# Patient Record
Sex: MALE | Race: WHITE | NOT HISPANIC OR LATINO | Employment: OTHER | URBAN - METROPOLITAN AREA
[De-identification: names, ages, dates, MRNs, and addresses within clinical notes are randomized per-mention and may not be internally consistent; named-entity substitution may affect disease eponyms.]

---

## 2024-01-01 ENCOUNTER — NURSING HOME VISIT (OUTPATIENT)
Dept: GERIATRICS | Facility: CLINIC | Age: 84
End: 2024-01-01
Payer: OTHER MISCELLANEOUS

## 2024-01-01 ENCOUNTER — HOSPITAL ENCOUNTER (OUTPATIENT)
Facility: HOSPITAL | Age: 84
Setting detail: OBSERVATION
Discharge: NURSING, REHAB OR LONG-TERM CARE | End: 2024-03-15
Attending: EMERGENCY MEDICINE | Admitting: EMERGENCY MEDICINE
Payer: OTHER MISCELLANEOUS

## 2024-01-01 ENCOUNTER — MEDICAL CORRESPONDENCE (OUTPATIENT)
Dept: HEALTH INFORMATION MANAGEMENT | Facility: CLINIC | Age: 84
End: 2024-01-01

## 2024-01-01 ENCOUNTER — PATIENT OUTREACH (OUTPATIENT)
Dept: CARE COORDINATION | Facility: CLINIC | Age: 84
End: 2024-01-01
Payer: COMMERCIAL

## 2024-01-01 ENCOUNTER — MEDICAL CORRESPONDENCE (OUTPATIENT)
Dept: HEALTH INFORMATION MANAGEMENT | Facility: CLINIC | Age: 84
End: 2024-01-01
Payer: COMMERCIAL

## 2024-01-01 ENCOUNTER — DOCUMENTATION ONLY (OUTPATIENT)
Dept: GERIATRICS | Facility: CLINIC | Age: 84
End: 2024-01-01
Payer: COMMERCIAL

## 2024-01-01 VITALS
RESPIRATION RATE: 20 BRPM | HEIGHT: 69 IN | WEIGHT: 129.6 LBS | OXYGEN SATURATION: 92 % | SYSTOLIC BLOOD PRESSURE: 136 MMHG | HEART RATE: 90 BPM | BODY MASS INDEX: 19.2 KG/M2 | DIASTOLIC BLOOD PRESSURE: 77 MMHG | TEMPERATURE: 98.4 F

## 2024-01-01 VITALS
WEIGHT: 147.49 LBS | DIASTOLIC BLOOD PRESSURE: 60 MMHG | SYSTOLIC BLOOD PRESSURE: 109 MMHG | TEMPERATURE: 98.1 F | OXYGEN SATURATION: 94 % | RESPIRATION RATE: 16 BRPM | HEART RATE: 74 BPM

## 2024-01-01 DIAGNOSIS — G93.89 BRAIN MASS: Primary | ICD-10-CM

## 2024-01-01 DIAGNOSIS — Z51.5 HOSPICE CARE PATIENT: ICD-10-CM
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
## 2024-01-01 DIAGNOSIS — I48.0 PAROXYSMAL ATRIAL FIBRILLATION (H): ICD-10-CM

## 2024-01-01 DIAGNOSIS — S41.111A LACERATION OF RIGHT UPPER EXTREMITY, INITIAL ENCOUNTER: ICD-10-CM

## 2024-01-01 DIAGNOSIS — F03.90 MAJOR NEUROCOGNITIVE DISORDER (H): ICD-10-CM

## 2024-01-01 PROCEDURE — 99285 EMERGENCY DEPT VISIT HI MDM: CPT | Mod: 25

## 2024-01-01 PROCEDURE — G0378 HOSPITAL OBSERVATION PER HR: HCPCS

## 2024-01-01 PROCEDURE — 99239 HOSP IP/OBS DSCHRG MGMT >30: CPT | Performed by: STUDENT IN AN ORGANIZED HEALTH CARE EDUCATION/TRAINING PROGRAM

## 2024-01-01 PROCEDURE — 250N000013 HC RX MED GY IP 250 OP 250 PS 637: Performed by: STUDENT IN AN ORGANIZED HEALTH CARE EDUCATION/TRAINING PROGRAM

## 2024-01-01 PROCEDURE — 12002 RPR S/N/AX/GEN/TRNK2.6-7.5CM: CPT

## 2024-01-01 PROCEDURE — 99304 1ST NF CARE SF/LOW MDM 25: CPT | Performed by: NURSE PRACTITIONER

## 2024-01-01 PROCEDURE — 250N000009 HC RX 250: Performed by: EMERGENCY MEDICINE

## 2024-01-01 PROCEDURE — 99223 1ST HOSP IP/OBS HIGH 75: CPT | Performed by: STUDENT IN AN ORGANIZED HEALTH CARE EDUCATION/TRAINING PROGRAM

## 2024-01-01 PROCEDURE — 999N000147 HC STATISTIC PT IP EVAL DEFER

## 2024-01-01 RX ORDER — NALOXONE HYDROCHLORIDE 0.4 MG/ML
0.4 INJECTION, SOLUTION INTRAMUSCULAR; INTRAVENOUS; SUBCUTANEOUS
Status: DISCONTINUED | OUTPATIENT
Start: 2024-01-01 | End: 2024-01-01 | Stop reason: HOSPADM

## 2024-01-01 RX ORDER — HALOPERIDOL 2 MG/ML
1 SOLUTION ORAL
COMMUNITY

## 2024-01-01 RX ORDER — ACETAMINOPHEN 650 MG/1
650 SUPPOSITORY RECTAL EVERY 4 HOURS PRN
Status: DISCONTINUED | OUTPATIENT
Start: 2024-01-01 | End: 2024-01-01 | Stop reason: HOSPADM

## 2024-01-01 RX ORDER — MORPHINE SULFATE 20 MG/ML
5 SOLUTION ORAL
COMMUNITY
Start: 2024-01-01

## 2024-01-01 RX ORDER — ACETAMINOPHEN 325 MG/1
650 TABLET ORAL EVERY 4 HOURS PRN
Status: DISCONTINUED | OUTPATIENT
Start: 2024-01-01 | End: 2024-01-01 | Stop reason: HOSPADM

## 2024-01-01 RX ORDER — QUETIAPINE FUMARATE 25 MG/1
25 TABLET, FILM COATED ORAL 3 TIMES DAILY PRN
COMMUNITY
End: 2024-01-01

## 2024-01-01 RX ORDER — LORAZEPAM 2 MG/ML
1 CONCENTRATE ORAL EVERY 5 MIN PRN
Status: DISCONTINUED | OUTPATIENT
Start: 2024-01-01 | End: 2024-01-01 | Stop reason: HOSPADM

## 2024-01-01 RX ORDER — AMOXICILLIN 250 MG
1 CAPSULE ORAL 2 TIMES DAILY PRN
Status: DISCONTINUED | OUTPATIENT
Start: 2024-01-01 | End: 2024-01-01 | Stop reason: HOSPADM

## 2024-01-01 RX ORDER — AMOXICILLIN 250 MG
2 CAPSULE ORAL 2 TIMES DAILY PRN
Status: DISCONTINUED | OUTPATIENT
Start: 2024-01-01 | End: 2024-01-01 | Stop reason: HOSPADM

## 2024-01-01 RX ORDER — TRAZODONE HYDROCHLORIDE 50 MG/1
50 TABLET, FILM COATED ORAL AT BEDTIME
COMMUNITY
End: 2024-01-01

## 2024-01-01 RX ORDER — LORAZEPAM 0.5 MG/1
.5-1 TABLET ORAL EVERY 4 HOURS PRN
Status: DISCONTINUED | OUTPATIENT
Start: 2024-01-01 | End: 2024-01-01 | Stop reason: HOSPADM

## 2024-01-01 RX ORDER — BISACODYL 10 MG
10 SUPPOSITORY, RECTAL RECTAL DAILY PRN
Status: DISCONTINUED | OUTPATIENT
Start: 2024-01-01 | End: 2024-01-01 | Stop reason: HOSPADM

## 2024-01-01 RX ORDER — PROCHLORPERAZINE 25 MG
25 SUPPOSITORY, RECTAL RECTAL EVERY 12 HOURS PRN
COMMUNITY
Start: 2024-01-01

## 2024-01-01 RX ORDER — QUETIAPINE FUMARATE 25 MG/1
25 TABLET, FILM COATED ORAL 3 TIMES DAILY PRN
Status: DISCONTINUED | OUTPATIENT
Start: 2024-01-01 | End: 2024-01-01 | Stop reason: HOSPADM

## 2024-01-01 RX ORDER — TRAZODONE HYDROCHLORIDE 50 MG/1
50 TABLET, FILM COATED ORAL AT BEDTIME
Status: DISCONTINUED | OUTPATIENT
Start: 2024-01-01 | End: 2024-01-01 | Stop reason: HOSPADM

## 2024-01-01 RX ORDER — LORAZEPAM 1 MG/1
1 TABLET ORAL
COMMUNITY
Start: 2024-01-01

## 2024-01-01 RX ORDER — NALOXONE HYDROCHLORIDE 0.4 MG/ML
0.2 INJECTION, SOLUTION INTRAMUSCULAR; INTRAVENOUS; SUBCUTANEOUS
Status: DISCONTINUED | OUTPATIENT
Start: 2024-01-01 | End: 2024-01-01 | Stop reason: HOSPADM

## 2024-01-01 RX ORDER — HALOPERIDOL 2 MG/ML
.5-1 SOLUTION ORAL EVERY 4 HOURS PRN
Status: DISCONTINUED | OUTPATIENT
Start: 2024-01-01 | End: 2024-01-01 | Stop reason: HOSPADM

## 2024-01-01 RX ORDER — HALOPERIDOL 2 MG/ML
.5-1 SOLUTION ORAL EVERY 4 HOURS PRN
COMMUNITY
End: 2024-01-01

## 2024-01-01 RX ORDER — POLYETHYLENE GLYCOL 3350 17 G/17G
17 POWDER, FOR SOLUTION ORAL DAILY
Status: DISCONTINUED | OUTPATIENT
Start: 2024-01-01 | End: 2024-01-01 | Stop reason: HOSPADM

## 2024-01-01 RX ORDER — PROCHLORPERAZINE 25 MG
25 SUPPOSITORY, RECTAL RECTAL EVERY 12 HOURS PRN
Status: DISCONTINUED | OUTPATIENT
Start: 2024-01-01 | End: 2024-01-01 | Stop reason: HOSPADM

## 2024-01-01 RX ORDER — ONDANSETRON 2 MG/ML
4 INJECTION INTRAMUSCULAR; INTRAVENOUS EVERY 6 HOURS PRN
Status: DISCONTINUED | OUTPATIENT
Start: 2024-01-01 | End: 2024-01-01 | Stop reason: HOSPADM

## 2024-01-01 RX ORDER — QUETIAPINE FUMARATE 25 MG/1
25 TABLET, FILM COATED ORAL AT BEDTIME
COMMUNITY
End: 2024-01-01

## 2024-01-01 RX ORDER — GINSENG 100 MG
CAPSULE ORAL ONCE
Status: COMPLETED | OUTPATIENT
Start: 2024-01-01 | End: 2024-01-01

## 2024-01-01 RX ORDER — TAMSULOSIN HYDROCHLORIDE 0.4 MG/1
0.4 CAPSULE ORAL EVERY EVENING
COMMUNITY
End: 2024-01-01

## 2024-01-01 RX ORDER — ONDANSETRON 4 MG/1
4 TABLET, ORALLY DISINTEGRATING ORAL EVERY 6 HOURS PRN
Status: DISCONTINUED | OUTPATIENT
Start: 2024-01-01 | End: 2024-01-01 | Stop reason: HOSPADM

## 2024-01-01 RX ORDER — HALOPERIDOL 2 MG/ML
1-2 SOLUTION ORAL EVERY 30 MIN PRN
Status: DISCONTINUED | OUTPATIENT
Start: 2024-01-01 | End: 2024-01-01 | Stop reason: HOSPADM

## 2024-01-01 RX ORDER — QUETIAPINE FUMARATE 25 MG/1
25 TABLET, FILM COATED ORAL AT BEDTIME
Status: DISCONTINUED | OUTPATIENT
Start: 2024-01-01 | End: 2024-01-01 | Stop reason: HOSPADM

## 2024-01-01 RX ORDER — LORAZEPAM 2 MG/ML
1 CONCENTRATE ORAL EVERY 30 MIN PRN
COMMUNITY
Start: 2024-01-01

## 2024-01-01 RX ORDER — MORPHINE SULFATE 20 MG/ML
5 SOLUTION ORAL EVERY 4 HOURS PRN
Status: DISCONTINUED | OUTPATIENT
Start: 2024-01-01 | End: 2024-01-01 | Stop reason: HOSPADM

## 2024-01-01 RX ORDER — BISACODYL 10 MG
10 SUPPOSITORY, RECTAL RECTAL DAILY PRN
COMMUNITY

## 2024-01-01 RX ORDER — TRAZODONE HYDROCHLORIDE 50 MG/1
25 TABLET, FILM COATED ORAL EVERY EVENING
COMMUNITY
End: 2024-01-01

## 2024-01-01 RX ORDER — HALOPERIDOL 1 MG/1
1 TABLET ORAL EVERY 6 HOURS PRN
COMMUNITY
Start: 2024-01-01

## 2024-01-01 RX ADMIN — BACITRACIN 2 PACKET: 500 OINTMENT TOPICAL at 17:31

## 2024-01-01 RX ADMIN — MORPHINE SULFATE 5 MG: 20 SOLUTION ORAL at 09:54

## 2024-01-01 RX ADMIN — TRAZODONE HYDROCHLORIDE 25 MG: 50 TABLET ORAL at 18:01

## 2024-01-01 RX ADMIN — POLYETHYLENE GLYCOL 3350 17 G: 17 POWDER, FOR SOLUTION ORAL at 09:45

## 2024-01-01 ASSESSMENT — ACTIVITIES OF DAILY LIVING (ADL)
ADLS_ACUITY_SCORE: 43
ADLS_ACUITY_SCORE: 43
DEPENDENT_IADLS:: CLEANING;COOKING;LAUNDRY;SHOPPING;MEAL PREPARATION;MEDICATION MANAGEMENT;MONEY MANAGEMENT;TRANSPORTATION
ADLS_ACUITY_SCORE: 35
ADLS_ACUITY_SCORE: 41
ADLS_ACUITY_SCORE: 49
ADLS_ACUITY_SCORE: 35
ADLS_ACUITY_SCORE: 43
ADLS_ACUITY_SCORE: 49
ADLS_ACUITY_SCORE: 49
ADLS_ACUITY_SCORE: 41
ADLS_ACUITY_SCORE: 43
ADLS_ACUITY_SCORE: 35
ADLS_ACUITY_SCORE: 37
ADLS_ACUITY_SCORE: 49
ADLS_ACUITY_SCORE: 43
ADLS_ACUITY_SCORE: 43
ADLS_ACUITY_SCORE: 49
ADLS_ACUITY_SCORE: 35
ADLS_ACUITY_SCORE: 47
ADLS_ACUITY_SCORE: 43
ADLS_ACUITY_SCORE: 35
ADLS_ACUITY_SCORE: 37
ADLS_ACUITY_SCORE: 43
ADLS_ACUITY_SCORE: 49
ADLS_ACUITY_SCORE: 49
ADLS_ACUITY_SCORE: 35

## 2024-01-01 ASSESSMENT — COLUMBIA-SUICIDE SEVERITY RATING SCALE - C-SSRS: IS THE PATIENT NOT ABLE TO COMPLETE C-SSRS: UNABLE TO VERBALIZE

## 2024-03-14 PROBLEM — N52.9 ERECTILE DYSFUNCTION: Status: ACTIVE | Noted: 2024-01-01

## 2024-03-14 PROBLEM — C44.90 MALIGNANT NEOPLASM OF SKIN: Status: ACTIVE | Noted: 2024-01-01

## 2024-03-14 PROBLEM — H25.813 COMBINED FORMS OF AGE-RELATED CATARACT OF BOTH EYES: Status: ACTIVE | Noted: 2021-03-24

## 2024-03-14 PROBLEM — Z85.828 HISTORY OF BASAL CELL CARCINOMA (BCC): Status: ACTIVE | Noted: 2024-01-01
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
## 2024-03-14 PROBLEM — G93.89 BRAIN MASS: Status: ACTIVE | Noted: 2024-01-01

## 2024-03-14 PROBLEM — E55.9 VITAMIN D DEFICIENCY: Status: ACTIVE | Noted: 2018-06-18

## 2024-03-14 PROBLEM — N50.1 SCROTAL BLEEDING: Status: ACTIVE | Noted: 2024-01-01

## 2024-03-14 PROBLEM — C61 PROSTATE CANCER (H): Status: ACTIVE | Noted: 2024-01-01

## 2024-03-14 PROBLEM — S41.111A LACERATION OF RIGHT UPPER EXTREMITY, INITIAL ENCOUNTER: Status: ACTIVE | Noted: 2024-01-01

## 2024-03-14 PROBLEM — H35.3131 EARLY DRY STAGE NONEXUDATIVE AGE-RELATED MACULAR DEGENERATION OF BOTH EYES: Status: ACTIVE | Noted: 2019-06-26

## 2024-03-14 PROBLEM — I47.10 PAROXYSMAL SUPRAVENTRICULAR TACHYCARDIA (H): Status: ACTIVE | Noted: 2024-01-01

## 2024-03-14 PROBLEM — J30.9 ALLERGIC RHINITIS: Status: ACTIVE | Noted: 2024-01-01

## 2024-03-14 PROBLEM — Z51.5 HOSPICE CARE PATIENT: Status: ACTIVE | Noted: 2024-01-01

## 2024-03-14 PROBLEM — F03.90 MAJOR NEUROCOGNITIVE DISORDER (H): Status: ACTIVE | Noted: 2024-01-01

## 2024-03-14 PROBLEM — J45.909 ASTHMA: Status: ACTIVE | Noted: 2024-01-01

## 2024-03-14 PROBLEM — I48.91 ATRIAL FIBRILLATION (H): Status: ACTIVE | Noted: 2024-01-01

## 2024-03-14 NOTE — ED TRIAGE NOTES
Patient brought in by [facility], states discharged from here yesterday with a brain tumor, on Hospice, in long term care facility, today at 1045 was walking with aid and fell into wardrobe hitting his right arm, wrapped, dressing CDI, did not hit head.  No thinners. Initial pressure was 90;s/60;s, blood sugar 10. Received Morphine 5mg at 1130.       Triage Assessment (Adult)       Row Name 03/14/24 4396          Triage Assessment    Airway WDL WDL        Respiratory WDL    Respiratory WDL WDL        Skin Circulation/Temperature WDL    Skin Circulation/Temperature WDL WDL        Cardiac WDL    Cardiac WDL WDL        Peripheral/Neurovascular WDL    Peripheral Neurovascular WDL WDL        Cognitive/Neuro/Behavioral WDL    Cognitive/Neuro/Behavioral WDL WDL

## 2024-03-14 NOTE — ED NOTES
Patient to be admitted, living in assisted living, needs 24 hour care, family at bedside and updated by Dr. Lassiter, charge RN updated.

## 2024-03-14 NOTE — ED NOTES
Bed: JNEDH-M  Expected date: 3/14/24  Expected time: 3:49 PM  Means of arrival: Ambulance  Comments:  WBL   83M  Fall

## 2024-03-14 NOTE — ED NOTES
[facility] ED Handoff Report    ED Chief Complaint: Placement    ED Diagnosis:  (W19.XXXA) Fall, initial encounter  Comment: unsteady, AMS doesn't use call light or make needs known  Plan: 1:1    (S41.111A) Laceration of right upper extremity, initial encounter  Comment: sutures, bacitracin and wrapped  Plan: dressing changes    (Z51.5) Hospice care patient  Comment: placement with more cares, can't go back to assisted living  Plan: find 24 hour care/hospice       PMH:  No past medical history on file.     Code Status:  No CPR- Do NOT Intubate     Falls Risk: Yes Band: Applied    Current Living Situation/Residence: lives in a skilled nursing facility     Elimination Status: Continent: Yes/wears pad    Activity Level: SBA w/ walker    Patients Preferred Language:  English     Needed: Yes    Vital Signs:  /68   Pulse 71   Temp 98  F (36.7  C) (Oral)   Resp 16      Cardiac Rhythm: NA    Pain Score: 0/10    Is the Patient Confused:  Yes    Last Food or Drink: 03/14/24 at lunch    Focused Assessment:  RUE wrapped with dressing via laceration, AMS, 1:1    Tests Performed: Done: NA    Treatments Provided:  see notes    Family Dynamics/Concerns: No    Family Updated On Visitor Policy: Yes    Plan of Care Communicated to Family: Yes    Who Was Updated about Plan of Care: son and wife    Belongings Checklist Done and Signed by Patient: Yes    Belongings Sent with Patient: clothes    Medications sent with patient: na    Covid: asymptomatic , na    Additional Information: family here and supportive    RN:  3/14/2024 6:52 PM

## 2024-03-14 NOTE — ED PROVIDER NOTES
EMERGENCY DEPARTMENT ENCOUNTER      NAME: Aaron Miller  AGE: 83 year old male  YOB: 1940  MRN: [id number]  EVALUATION DATE & TIME: 3/14/2024  4:03 PM    PCP: Elana Ron    ED PROVIDER: Jon Lassiter MD    Chief Complaint   Patient presents with    Laceration    Fall     FINAL IMPRESSION:  1. Fall, initial encounter    2. Laceration of right upper extremity, initial encounter    3. Hospice care patient      ED COURSE & MEDICAL DECISION MAKIN:33 PM I met with the patient to gather history and to perform my initial exam. I discussed the plan for care while in the Emergency Department.   4:40 PM I performed the laceration repair.  5:58 PM I spoke with Jean Paul Whittington DO, hospitalist about patient admission.     Pertinent Labs & Imaging studies reviewed. (See chart for details)  83 year old male presents to the Emergency Department for evaluation of laceration to the dorsal surface of the right forearm.  Patient recently placed on hospice therapy after hospital stay in [facility].  Has brain tumor.  Sent to [facility] in [place].  Was discharged from the hospital yesterday.  Unfortunately it has been challenging per family's report in the sense that the facility does not seem to have the staff to safely manage the patient.  There is been a lot of issues with the patient getting out of bed and subsequently falling.  1 of these falls is ultimately how he sustained the laceration to his arm.  They initially put Steri-Strips on the wound but subsequently due to persistent bleeding symptoms to the emergency department for assessment.  On examination patient had a V-shaped wound that was several centimeters in length and did penetrate through the dermis and would require surgical repair.  After anesthetizing the area and cleaning it the laceration was repaired without complication.  I had a long discussion with the patient's family.  They are in the process of ramping up his hospice  "therapy through the [facility] system.  The [facility] hospice nurse per family report instructed them in part to go to the hospital to get admitted secondary to the lack of this current facility being able to support the patient appropriately.  Explored options with the family about getting additional family support to help with the situation but further discussion that is not an option and they are requesting admission to the hospital for \"safety\".  Challenging clinical scenario as there is not going to be an easy disposition for this patient into the community setting.  I contacted and discussed the case with care management reviewing the situation and they felt that this point there was no alternative safe disposition except readmission to the hospital.  I contacted and discussed the case with the hospitalist service.     Medical Decision Making  Obtained supplemental history:Supplemental history obtained?: Documented in chart and Family Member/Significant Other  Reviewed external records: External records reviewed?: Documented in chart and Inpatient Record: 2/26/24 - 3/13/24 at [facility] for unstable gait  Care impacted by chronic illness:Cancer/Chemotherapy, Chronic Lung Disease, Heart Disease, Hyperlipidemia, and Other: major neurocognitive disorder   Care significantly affected by social determinants of health:N/A  Did you consider but not order tests?: Work up considered but not performed and documented in chart, if applicable  Did you interpret images independently?: Independent interpretation of ECG and images noted in documentation, when applicable.  Consultation discussion with other provider:Did you involve another provider (consultant, MH, pharmacy, etc.)?: I discussed the care with another health care provider, see documentation for details.  Admit.    At the conclusion of the encounter I discussed the results of all of the tests and the disposition. The questions were answered. The patient or family " acknowledged understanding and was agreeable with the care plan.     MEDICATIONS GIVEN IN THE EMERGENCY:  Medications   senna-docusate (SENOKOT-S/PERICOLACE) 8.6-50 MG per tablet 1 tablet (has no administration in time range)     Or   senna-docusate (SENOKOT-S/PERICOLACE) 8.6-50 MG per tablet 2 tablet (has no administration in time range)   ondansetron (ZOFRAN ODT) ODT tab 4 mg (has no administration in time range)     Or   ondansetron (ZOFRAN) injection 4 mg (has no administration in time range)   acetaminophen (TYLENOL) tablet 650 mg (has no administration in time range)     Or   acetaminophen (TYLENOL) Suppository 650 mg (has no administration in time range)   melatonin tablet 1 mg (has no administration in time range)   polyethylene glycol (MIRALAX) Packet 17 g (has no administration in time range)   bacitracin ointment (2 packets Topical $Given 3/14/24 5470)       NEW PRESCRIPTIONS STARTED AT TODAY'S ER VISIT  New Prescriptions    No medications on file          =================================================================    HPI    Patient information was obtained from: patient, patient's son    Use of : N/A         Aaron Miller is a 83 year old male with a pertinent history of asthma, atrial fibrillation, basal cell carcinoma, prostate cancer, hyperlipidemia, major neurocognitive disorder who presents to this ED by EMS for evaluation of laceration, fall.     Per patient's son, patient was started on hospice at [facility] yesterday. Today at 10:30 AM, patient fell into the wardrobe which resulted in a laceration to the right forearm. Per patient's nurse at [facility], they don't believe the patient hit his head and was alert after the fall. Patient's son states patient is unstable on his feet due to the location of his cancer.     Per EMS, initial blood pressure was 90s/60s. Received 5 mg morphine at 11:30 AM. Patient not on blood thinners.     Per chart review, patient was seen from 2/26/24 -  3/13/24 at [facility] for unstable gait. Patient had a fall on January 21, 2024. Notably has had progressive gait issues dizziness speech issues especially over the past week intensifying in nature. 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 Family has decided on not pursuing brain biopsy. Goals of care currently being discussed. Hospice has been consulted. Has been made DNR. Discharge to hospice care facility on 3/13. Non-comfort focused medications discontinued.       REVIEW OF SYSTEMS   Review of Systems   See HPI.     PAST MEDICAL HISTORY:  No past medical history on file.    PAST SURGICAL HISTORY:  No past surgical history on file.        CURRENT MEDICATIONS:    bisacodyl (DULCOLAX) 10 MG suppository  haloperidol (HALDOL) 2 MG/ML (HIGH CONC) solution  haloperidol (HALDOL) 2 MG/ML (HIGH CONC) solution  hyoscyamine (LEVSIN/SL) 0.125 MG sublingual tablet  LORazepam (ATIVAN) 1 MG tablet  LORazepam (ATIVAN) 2 MG/ML (HIGH CONC) oral solution  morphine sulfate (ROXANOL) 20 mg/mL (HIGH CONC) soln  prochlorperazine (COMPAZINE) 25 MG suppository  QUEtiapine (SEROQUEL) 25 MG tablet  QUEtiapine (SEROQUEL) 25 MG tablet  traZODone (DESYREL) 50 MG tablet  traZODone (DESYREL) 50 MG tablet        ALLERGIES:  No Known Allergies    FAMILY HISTORY:  No family history on file.    SOCIAL HISTORY:   Social History     Socioeconomic History    Marital status:        VITALS:  /60   Pulse 70   Temp 98  F (36.7  C) (Oral)   Resp 16   SpO2 95%     PHYSICAL EXAM    VITAL SIGNS: /60   Pulse 70   Temp 98  F (36.7  C) (Oral)   Resp 16   SpO2 95%   Constitutional: Chronically ill-appearing elderly male, fatigued and somnolent.  EYES: Conjunctivae clear, no discharge  HENT: Atraumatic, normocephalic, bilateral external ears normal.  Oropharynx moist. Nose normal.   Respiratory:  No respiratory distress, normal nonlabored respirations.    Cardiovascular:  Distal perfusion appears intact  Musculoskeletal: Laceration to the anterior forearm as detailed below  Integument: Laceration forearm on the right side.  Neurologic:  Alert and oriented. No focal deficits noted.  Ambulatory  Psychiatric:  Affect normal, Judgment normal, Mood normal.    PROCEDURES:   PROCEDURE: Laceration Repair   INDICATIONS: Laceration   PROCEDURE PROVIDER: Dr Jon Lassiter   SITE: Right forearm    TYPE/SIZE: simple, clean, and no foreign body visualized  7 cm (total length)   FUNCTIONAL ASSESSMENT: Distal sensation, circulation, and motor intact   MEDICATION: 5 mLs of 1% Lidocaine without epinephrine   PREPARATION: irrigation with Normal saline   DEBRIDEMENT: no debridement   CLOSURE:  Superficial layer closed with 12 stitches of 4-0 Ethilon simple interrupted    Total number of sutures/staples placed: 12             I, Gavino Samuel, am serving as a scribe to document services personally performed by Jon Lassiter MD based on my observation and the provider's statements to me. I, Jon Lassiter MD, attest that Gavino Samuel is acting in a scribe capacity, has observed my performance of the services and has documented them in accordance with my direction.    Jon Lassiter MD  [facility] EMERGENCY DEPARTMENT  [address] [place] [id number]  [phone]        Jon Lassiter MD  03/14/24 0801

## 2024-03-15 NOTE — PLAN OF CARE
"PRIMARY DIAGNOSIS: \"GENERIC\" NURSING  OUTPATIENT/OBSERVATION GOALS TO BE MET BEFORE DISCHARGE:  ADLs back to baseline: No    Activity and level of assistance: Up with standby assistance.    Pain status: Pain free.    Return to near baseline physical activity: No     Discharge Planner Nurse   Safe discharge environment identified: Yes  Barriers to discharge: No       Entered by: Kenna Saenz RN 03/15/2024 5:57 PM     Please review provider order for any additional goals.   Nurse to notify provider when observation goals have been met and patient is ready for discharge.Goal Outcome Evaluation:                        "

## 2024-03-15 NOTE — PLAN OF CARE
PRIMARY DIAGNOSIS: GENERALIZED WEAKNESS    OUTPATIENT/OBSERVATION GOALS TO BE MET BEFORE DISCHARGE  1. Orthostatic performed: N/A    2. Tolerating PO medications: No    3. Return to near baseline physical activity: No    4. Cleared for discharge by consultants (if involved): No    Discharge Planner Nurse   Safe discharge environment identified: No  Barriers to discharge: Yes       Entered by: Veena Bob RN 03/14/2024 10:25 PM     Please review provider order for any additional goals.   Nurse to notify provider when observation goals have been met and patient is ready for discharge.Goal Outcome Evaluation:       Pt very drowsy. Patient does open his eyes. Patient denies pain. Held patient meds due to drowsiness. Pt is 1:1

## 2024-03-15 NOTE — PLAN OF CARE
"PRIMARY DIAGNOSIS: \"GENERIC\" NURSING  OUTPATIENT/OBSERVATION GOALS TO BE MET BEFORE DISCHARGE:  ADLs back to baseline: No    Activity and level of assistance: Up with maximum assistance. Consider SW and/or PT evaluation.     Pain status: Pain free.    Return to near baseline physical activity: No     Discharge Planner Nurse   Safe discharge environment identified: No  Barriers to discharge: Yes       Entered by: Samuel Fournier RN 03/15/2024 5:45 AM     Please review provider order for any additional goals.   Nurse to notify provider when observation goals have been met and patient is ready for discharge.  Goal Outcome Evaluation:    Patient rested most of the night. Did awaken and attempt to get up, follows commands well. Turned and repositioned, oral cares provided. Incontinent brief overnight. Appears comfortable and without pain.   "

## 2024-03-15 NOTE — PLAN OF CARE
OT Deferral Note    Occupational Therapy: Orders received. Chart reviewed and discussed with care team.? Occupational Therapy not indicated due to patient was admitted from memory care facility where he was receiving hospice services and total cares.? Defer discharge recommendations to medical team.? Will complete orders.     LANI Preciado/L. 3/15/2024, 1:24 PM

## 2024-03-15 NOTE — PLAN OF CARE
"PRIMARY DIAGNOSIS: \"GENERIC\" NURSING  OUTPATIENT/OBSERVATION GOALS TO BE MET BEFORE DISCHARGE:  ADLs back to baseline: No    Activity and level of assistance: Up with maximum assistance. Consider SW and/or PT evaluation.     Pain status: Pain free.    Return to near baseline physical activity: No     Discharge Planner Nurse   Safe discharge environment identified: No  Barriers to discharge: Yes       Entered by: Samuel Fournier RN 03/15/2024 1:02 AM     Please review provider order for any additional goals.   Nurse to notify provider when observation goals have been met and patient is ready for discharge.  Goal Outcome Evaluation:  Patient lethargic in bed, resting well and follows commands with cares. Dressing CDI. No new concerns.  "

## 2024-03-15 NOTE — CONSULTS
"Care Management Initial Consult    General Information  Assessment completed with: Patient, Children, Aaron sleeping during my interaction and questions. Sons Emanuel and Ashish and daughter in law Alexandra.  Type of CM/SW Visit: Initial Assessment    Primary Care Provider verified and updated as needed: Yes   Readmission within the last 30 days: previous discharge plan unsuccessful (2/26/24 - 3/13/24 at [facility])   Return Category: New Diagnosis  Reason for Consult: discharge planning, end of life/hospice, facility placement, financial concerns, grief and loss, community resources, insurance concerns  Advance Care Planning: Advance Care Planning Reviewed: no concerns identified          Communication Assessment  Patient's communication style: spoken language (English or Bilingual)             Cognitive  Cognitive/Neuro/Behavioral: history of basal cell carcinoma presented to [facility] with progressive dizziness and some word substitution and they found a new brain mass.     Living Environment:   People in home: facility resident  \"He was living in a townhouse with his wife Jihan until 2/26/24 - 3/13/24 hospitalization at [facility]. From there he went to [facility] on 3/13/24.\"  Current living Arrangements: extended care facility  Name of Facility: [facility]   Able to return to prior arrangements: other (see comments) (unknown at this time)       Family/Social Support:  Care provided by: other (see comments), child(billy), spouse/significant other, self (Memory Care staff)  Provides care for: no one, unable/limited ability to care for self  Marital Status:   Wife, Children, Facility resident(s)/Staff  Jihan       Description of Support System: Supportive, Involved    Support Assessment: Adequate family and caregiver support, Adequate social supports    Current Resources:   Patient receiving home care services: No     Community Resources: Skilled Nursing Facility, Hospice " "([facility]. [facility])  Equipment currently used at home: none  Supplies currently used at home: Other (\"dentures\")    Employment/Financial:  Employment Status: retired, , previous service     Employment/ Comments: \"he uses his  benefits for getting shots and sometimes sees a [facility]. Doesn't use VA insurance and doesn't get admitted at [facility].\"  Financial Concerns:     Referral to Financial Worker: No       Does the patient's insurance plan have a 3 day qualifying hospital stay waiver?  No      Functional Status:  Prior to admission patient needed assistance:   Dependent ADLs:: Ambulation-no assistive device, Bathing, Dressing, Grooming, Positioning, Transfers, Toileting (\"he does not remember to push the call button for help with ambulation and then he falls\".)  Dependent IADLs:: Cleaning, Cooking, Laundry, Shopping, Meal Preparation, Medication Management, Money Management, Transportation  Assesssment of Functional Status: Not at baseline with ADL Functioning, Not at baseline with mobility, Not at  functional baseline    Mental Health Status:          Chemical Dependency Status:                Values/Beliefs:  Spiritual, Cultural Beliefs, Presybeterian Practices, Values that affect care:                 Additional Information:  Aaron was just discharged from [facility] 3/13/24 to [facility]. Prior to the 2/26/24 - 3/13/24 admission to [facility] he was living in a townhouse with his wife and they were independent. He also has help from [facility] services at [facility].    He has a new diagnosis of a brain mass and it is new for him to be total cares. \"He has right sided weakness and unsteady gait at baseline now. He is falling frequently and impulsive and forgetful. He is not remembering to use his call light to ask for help.\" Because of these issues family \"feels like he cannot return to [facility] " "because it is not a high enough level of care for him. He needs more help than they are providing and we don't really like the care he is getting there. He fell within 1 day of getting there and then they didn't want to send him into the ER for this laceration that we thought he needed to come to the ER for\".    He does not use any equipment for mobility.    Antony Ellington MD at [facility] called at 2021. They are going to discharge him from [facility] services and want us to place a new order for [facility] services once we come up with a discharge plan. He is going to alert his team about this discharge. I spoke to Isis when I called hospice to alert them of his admission.    He is going to be a difficult placement. I discussed different care levels of Assisted Living/Memory Care units, vs LTC, vs a Hospice House. They are aware and able to private pay for the facility. I am not sure they are fully understanding that he was getting a high level of services at [facility] and it wouldn't be much different at most other locations. I have explained this and they are \"shocked at his minimal care level that he was getting\". They are \"most interested in in a Hospice House for right now\". I also talked with them about the possibility that they as a family might need to figure out a schedule where family can be sitting with him during parts of the day or night or at all times to assist whichever facility he is at so he doesn't fall. They are aware that he cannot be on a 1:1 at most facilities like he was at [facility] and they are placing him on a 1:1 here tonight also. They stated, \"that is one thing that was delaying his discharge from [facility] because of him needing the 1:1 for safety\".    I placed a referral for [facility]. But it is after hours so both [facility] and [facility] will need to be contacted 3/15/24 during business hours to see if they have any availability. He may " benefit from Palliative involvement while in the hospital.    Contacts:  Emanuel son [phone].   Ashish son [phone].  Jihan wife [phone].    [facility] transport at discharge.    CM to follow for medical progression of care, discharge recommendations, and final discharge plan.    Leanne Almazan RN

## 2024-03-15 NOTE — PLAN OF CARE
Problem: Adult Inpatient Plan of Care  Goal: Plan of Care Review  Description: The Plan of Care Review/Shift note should be completed every shift.  The Outcome Evaluation is a brief statement about your assessment that the patient is improving, declining, or no change.  This information will be displayed automatically on your shift  note.  Outcome: Progressing   Goal Outcome Evaluation:       Pt denies pain when asked. Pt turned and repo for comfort. Pt ate 100% of dinner. Multiple family members were visiting this shift. Pt bed alarm on for safety. Dressing on arm changed. Pt incont of urine.

## 2024-03-15 NOTE — PROGRESS NOTES
[facility]    Medicine Progress Note - Hospitalist Service    Date of Admission:  3/14/2024    Assessment & Plan   Aaron Miller is a 83 year old male admitted on 3/14/2024. Patient was recently discharged from [facility] and spent about 1 day at a a memory care unit on hospice at which point he had a mechanical fall. Patient's family felt he wasn't receiving the monitoring her needed, needs close 24-hour supervision, and he was brought to the hospital. Patient was on hospice and will need new placement and re-initiation of hospice.    #CNS Glioma  #Hospice  Diagnosed recently at [facility]. 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 Patient transitioned to hospice. Was at [facility] for memory care. The patient was at the highest level of community care available. More intensive care options would be home with family, though sounds like this is not feasible, and inpatient hospice, but patient will likely not qualify for this with too long a life expectancy unless there is an out of pocket option that could be pursued.  -No lab draws or additional workup  -Continue PTA meds for comfort  -SW managing placement preferences with family, will need readmission to hospice at discharge    #Fall  -Did not hit head per report  -Right elbow laceration status post sutures in the ER  -wound care       Observation Goals: -diagnostic tests and consults completed and resulted, -vital signs normal or at patient baseline, -safe disposition plan has been identified, Nurse to notify provider when observation goals have been met and patient is ready for discharge.  Diet: Regular Diet Adult    DVT Prophylaxis: None, comfort care/hospice  Benson Catheter: Not present  Lines: None     Cardiac Monitoring: None  Code Status: No CPR- Do NOT Intubate      Clinically Significant Risk Factors Present on Admission                    # Dementia: noted on problem list        # Asthma: noted on problem  list        Disposition Plan      Expected Discharge Date: 03/15/2024                    SHAD ARANA MD  Hospitalist Service  [facility]  Securely message with [employer] (more info)  Text page via [employer] Paging/Directory   ______________________________________________________________________    Interval History   No acute events. Will work on next steps for leaving the hospital.    Physical Exam   Vital Signs: Temp: 98.2  F (36.8  C) Temp src: Oral BP: 113/55 Pulse: 69   Resp: 18 SpO2: 95 % O2 Device: None (Room air)    Weight: 147 lbs 7.8 oz    General Appearance:  Resting in bed, non-toxic  Respiratory: CTAB  Cardiovascular: RRR  GI: no pain with palpation   Skin: right forearm laceration with sutures, skin with overlaying bandage with serosanguinous drainage     Medical Decision Making       40 MINUTES SPENT BY ME on the date of service doing chart review, history, exam, documentation & further activities per the note.      Data         Imaging results reviewed over the past 24 hrs:   No results found for this or any previous visit (from the past 24 hour(s)).

## 2024-03-15 NOTE — PROGRESS NOTES
Physical Therapy: Orders received. Chart reviewed and discussed with care team.? Physical Therapy not indicated due to patient was admitted from memory care facility where he was receiving hospice services and total cares. Plan is for new placement and resumption of hospice services. No acute PT needs identified.? Defer discharge recommendations to care team.? Will complete orders.      Shaunna Romero, PT, DPT

## 2024-03-15 NOTE — PHARMACY-ADMISSION MEDICATION HISTORY
Pharmacist Admission Medication History    Admission medication history is complete. The information provided in this note is only as accurate as the sources available at the time of the update.    Information Source(s): Hospital records and Facility (U/NH/) medication list/MAR via in-person    Pertinent Information: [facility] in [place] faxed over a medication list but only a partial MAR so unable to determine if/when the patient took PRN medications    Changes made to PTA medication list:  Added: haloperidol, hyoscyamine, lorazepam, morphine, prochlorperazine, quetiapine, trazodone  Deleted: None  Changed: None    Allergies reviewed with patient and updates made in EHR: yes with family    Medication History Completed By: Niyah Perez RP 3/14/2024 8:57 PM    PTA Med List   Medication Sig Last Dose    bisacodyl (DULCOLAX) 10 MG suppository Place 10 mg rectally daily as needed for constipation Unknown    haloperidol (HALDOL) 2 MG/ML (HIGH CONC) solution Take 0.5-1 mg by mouth every 4 hours as needed for agitation Unknown    haloperidol (HALDOL) 2 MG/ML (HIGH CONC) solution Take 1-2 mg by mouth every 30 minutes as needed for agitation (severe agitation) Up to 3 doses     hyoscyamine (LEVSIN/SL) 0.125 MG sublingual tablet Place 1-2 tablets under the tongue every 4 hours as needed (copious secretions) Unknown    LORazepam (ATIVAN) 1 MG tablet Take 0.5-1 mg by mouth every 4 hours as needed for anxiety Unknown    LORazepam (ATIVAN) 2 MG/ML (HIGH CONC) oral solution Take 1 mg by mouth every 5 minutes as needed for seizures X 3 doses Unknown    morphine sulfate (ROXANOL) 20 mg/mL (HIGH CONC) soln Take 5 mg by mouth every 4 hours as needed for shortness of breath or pain Unknown    prochlorperazine (COMPAZINE) 25 MG suppository Place 25 mg rectally every 12 hours as needed for nausea Unknown    QUEtiapine (SEROQUEL) 25 MG tablet Take 25 mg by mouth at bedtime 3/13/2024 at hs    QUEtiapine (SEROQUEL) 25 MG  tablet Take 25 mg by mouth 3 times daily as needed (severe agitation) Unknown    traZODone (DESYREL) 50 MG tablet Take 50 mg by mouth at bedtime 3/13/2024 at hs    traZODone (DESYREL) 50 MG tablet Take 25 mg by mouth every evening At 6pm 3/13/2024 at 1800

## 2024-03-15 NOTE — H&P
[facility]    History and Physical - Hospitalist Service       Date of Admission:  3/14/2024    Assessment & Plan      Aaron Miller is a 83 year old male admitted on 3/14/2024.  Patient was recently discharged and spent about 1 day at an assisted living facility at which point he had a mechanical fall.  Staff at the [facility] unable to care for him as he needs close 24-hour supervision.  Patient is on hospice and will need to be admitted for new placement.    Hospice  -Discussed with sons at bedside, no lab draws or additional workup  -Continue PTA meds for comfort  -Per report, hospice is supposed to come in tomorrow to discuss options and will likely need placement    Fall  -Did not hit head per report  -Right elbow laceration status post sutures in the ER  -wound care       Observation Goals: -diagnostic tests and consults completed and resulted, -vital signs normal or at patient baseline, -safe disposition plan has been identified, Nurse to notify provider when observation goals have been met and patient is ready for discharge.  Diet: Regular Diet Adult    DVT Prophylaxis: None  Benson Catheter: Not present  Lines: None     Cardiac Monitoring: None  Code Status: No CPR- Do NOT Intubate      Clinically Significant Risk Factors Present on Admission                    # Dementia: noted on problem list        # Asthma: noted on problem list        Disposition Plan      Expected Discharge Date: 03/15/2024                  Jean Paul Whittington DO  Hospitalist Service  [facility]  Securely message with [employer] (more info)  Text page via [employer] Paging/Directory     ______________________________________________________________________    Chief Complaint   Fall    History is obtained from the patient's sons at bedside     History of Present Illness   Aaron Miller is a 83 year old male who was recently discharged on hospice to [facility], now presenting after a fall.  Staff at [facility]  state that patient is rather restless and impulsive, high fall risk and needing close to 24 /7 supervision which they are not able to accommodate for at this time.  Family is unable to take patient in despite him being on hospice.  Case was discussed with care management who recommends admission under observation for new placement.    Sons at bedside state no additional labs or workup warranted.  Patient is on hospice.  Comfort measures.      Past Medical History    No past medical history on file.    Past Surgical History   No past surgical history on file.    Prior to Admission Medications   Prior to Admission Medications   Prescriptions Last Dose Informant Patient Reported? Taking?   LORazepam (ATIVAN) 1 MG tablet Unknown  Yes Yes   Sig: Take 0.5-1 mg by mouth every 4 hours as needed for anxiety   LORazepam (ATIVAN) 2 MG/ML (HIGH CONC) oral solution Unknown  Yes Yes   Sig: Take 1 mg by mouth every 5 minutes as needed for seizures X 3 doses   QUEtiapine (SEROQUEL) 25 MG tablet 3/13/2024 at hs  Yes Yes   Sig: Take 25 mg by mouth at bedtime   QUEtiapine (SEROQUEL) 25 MG tablet Unknown  Yes Yes   Sig: Take 25 mg by mouth 3 times daily as needed (severe agitation)   bisacodyl (DULCOLAX) 10 MG suppository Unknown  Yes Yes   Sig: Place 10 mg rectally daily as needed for constipation   haloperidol (HALDOL) 2 MG/ML (HIGH CONC) solution Unknown  Yes Yes   Sig: Take 0.5-1 mg by mouth every 4 hours as needed for agitation   haloperidol (HALDOL) 2 MG/ML (HIGH CONC) solution   Yes Yes   Sig: Take 1-2 mg by mouth every 30 minutes as needed for agitation (severe agitation) Up to 3 doses   hyoscyamine (LEVSIN/SL) 0.125 MG sublingual tablet Unknown  Yes Yes   Sig: Place 1-2 tablets under the tongue every 4 hours as needed (copious secretions)   morphine sulfate (ROXANOL) 20 mg/mL (HIGH CONC) soln Unknown  Yes Yes   Sig: Take 5 mg by mouth every 4 hours as needed for shortness of breath or pain   prochlorperazine (COMPAZINE) 25 MG  suppository Unknown  Yes Yes   Sig: Place 25 mg rectally every 12 hours as needed for nausea   traZODone (DESYREL) 50 MG tablet 3/13/2024 at hs  Yes Yes   Sig: Take 50 mg by mouth at bedtime   traZODone (DESYREL) 50 MG tablet 3/13/2024 at 1800  Yes Yes   Sig: Take 25 mg by mouth every evening At 6pm      Facility-Administered Medications: None        Social History   I have reviewed this patient's social history and updated it with pertinent information if needed.         Family History         Allergies   No Known Allergies     Physical Exam   Vital Signs: Temp: 98  F (36.7  C) Temp src: Oral BP: 123/60 Pulse: 70   Resp: 16 SpO2: 95 % O2 Device: None (Room air)    Weight: 0 lbs 0 oz    General Appearance: Resting in bed, non-toxic  Respiratory: CTAB  Cardiovascular: RRR  GI: no pain with palpation   Skin: right forearm laceration with sutures, skin with overlaying bandage with serosanguinous drainage     Medical Decision Making       75 MINUTES SPENT BY ME on the date of service doing chart review, history, exam, documentation & further activities per the note.      Data         Imaging results reviewed over the past 24 hrs:   No results found for this or any previous visit (from the past 24 hour(s)).

## 2024-03-15 NOTE — PROGRESS NOTES
"PRIMARY DIAGNOSIS: \"GENERIC\" NURSING  OUTPATIENT/OBSERVATION GOALS TO BE MET BEFORE DISCHARGE:  ADLs back to baseline: No    Activity and level of assistance: Up with maximum assistance. Consider SW and/or PT evaluation.     Pain status: Improved-controlled with oral pain medications.    Return to near baseline physical activity: No     Discharge Planner Nurse   Safe discharge environment identified: No  Barriers to discharge: Yes       Entered by: Erin Montiel RN 03/15/2024 2:33 PM     Please review provider order for any additional goals.   Nurse to notify provider when observation goals have been met and patient is ready for discharge.  "

## 2024-03-15 NOTE — DISCHARGE SUMMARY
[facility]  Hospitalist Discharge Summary      Date of Admission:  3/14/2024  Date of Discharge:  3/15/2024  Discharging Provider: SHAD ARANA MD  Discharge Service: Hospitalist Service    Discharge Diagnoses     #Primary CNS glioma  #Hospice patient  #Mechanical fall  #Right elbow laceration s/p repair    Clinically Significant Risk Factors          Follow-ups Needed After Discharge   Follow-up Appointments     Follow Up and recommended labs and tests      Follow up with hospice agency            Unresulted Labs Ordered in the Past 30 Days of this Admission       No orders found for last 31 day(s).            Discharge Disposition   Discharged to long-term care facility, memory care with hospice  Condition at discharge: Stable    Hospital Course   Aaron Miller is a 83 year old male admitted on 3/14/2024. Patient was recently discharged from [facility] and spent about 1 day at a  memory care unit on hospice at which point he had a mechanical fall. Patient's family felt he wasn't receiving the monitoring her needed, needs close 24-hour supervision, and he was brought to the hospital. Patient was on hospice and will need new placement and re-initiation of hospice.    #CNS Glioma  #Hospice  Diagnosed recently at [facility]. 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 Patient transitioned to hospice. Was at [facility] in [facility] for memory care. The patient was at the highest level of community care available. More intensive care options would be home with family, though sounds like this is not feasible, and inpatient hospice, but patient will likely not qualify for this with too long a life expectancy unless there is an out of pocket option that could be pursued.  -No lab draws or additional workup  -Continue PTA meds for comfort  -Patient and family agreeable to returning to [facility] memory care with hospice, this was arranged    #Mechanical fall  #Right elbow laceration s/p  repair  -Did not hit head per report  -Right elbow laceration status post sutures in the ER, remove in 10 days  -wound care    Consultations This Hospital Stay   CARE MANAGEMENT / SOCIAL WORK IP CONSULT  PHYSICAL THERAPY ADULT IP CONSULT  OCCUPATIONAL THERAPY ADULT IP CONSULT    Code Status   No CPR- Do NOT Intubate    Time Spent on this Encounter   I, SHAD ARANA MD, personally saw the patient today and spent greater than 30 minutes discharging this patient.       SHAD ARANA MD  [address] [address]  Phone: [phone]  Fax: [phone]  ______________________________________________________________________    Physical Exam   Vital Signs: Temp: 98.2  F (36.8  C) Temp src: Oral BP: 113/55 Pulse: 69   Resp: 18 SpO2: 95 % O2 Device: None (Room air)    Weight: 147 lbs 7.8 oz    General Appearance:  Resting in bed, non-toxic  Respiratory: CTAB  Cardiovascular: RRR  GI: no pain with palpation   Skin: right forearm laceration with sutures, skin with overlaying bandage with serosanguinous drainage        Primary Care Physician   Elana Ron    Discharge Orders      Hospice Referral      General info for SNF    Length of Stay Estimate: Long Term Care  Condition at Discharge: Stable  Level of care:board and care  Rehabilitation Potential: Poor  Admission H&P remains valid and up-to-date: Yes  Recent Chemotherapy: N/A  Use Nursing Home Standing Orders: Yes     Follow Up and recommended labs and tests    Follow up with hospice agency     Reason for your hospital stay    Admitted after a fall. Laceration was present and sutures were placed. Discharged back to [facility] in [facility] for memory care with hospice.     Activity - Up with nursing assistance     Fall precautions     Seizure precautions     Diet    Follow this diet upon discharge: Orders Placed This Encounter      Regular Diet Adult       Significant Results and Procedures   Most Recent 3  CBC's:No lab results found.  Most Recent 3 BMP's:No lab results found., No results found for this or any previous visit.    Discharge Medications   Current Discharge Medication List        CONTINUE these medications which have NOT CHANGED    Details   bisacodyl (DULCOLAX) 10 MG suppository Place 10 mg rectally daily as needed for constipation      !! haloperidol (HALDOL) 2 MG/ML (HIGH CONC) solution Take 0.5-1 mg by mouth every 4 hours as needed for agitation      !! haloperidol (HALDOL) 2 MG/ML (HIGH CONC) solution Take 1-2 mg by mouth every 30 minutes as needed for agitation (severe agitation) Up to 3 doses      hyoscyamine (LEVSIN/SL) 0.125 MG sublingual tablet Place 1-2 tablets under the tongue every 4 hours as needed (copious secretions)      LORazepam (ATIVAN) 1 MG tablet Take 0.5-1 mg by mouth every 4 hours as needed for anxiety      LORazepam (ATIVAN) 2 MG/ML (HIGH CONC) oral solution Take 1 mg by mouth every 5 minutes as needed for seizures X 3 doses      morphine sulfate (ROXANOL) 20 mg/mL (HIGH CONC) soln Take 5 mg by mouth every 4 hours as needed for shortness of breath or pain      prochlorperazine (COMPAZINE) 25 MG suppository Place 25 mg rectally every 12 hours as needed for nausea      !! QUEtiapine (SEROQUEL) 25 MG tablet Take 25 mg by mouth at bedtime      !! QUEtiapine (SEROQUEL) 25 MG tablet Take 25 mg by mouth 3 times daily as needed (severe agitation)      !! traZODone (DESYREL) 50 MG tablet Take 50 mg by mouth at bedtime      !! traZODone (DESYREL) 50 MG tablet Take 25 mg by mouth every evening At 6pm       !! - Potential duplicate medications found. Please discuss with provider.        Allergies   No Known Allergies

## 2024-03-15 NOTE — UTILIZATION REVIEW
Concurrent stay review; Secondary Review Determination - [facility]        Under the authority of the Utilization Management Committee, the utilization review process indicated a secondary review on the above patient.  The review outcome is based on review of the medical records, discussions with staff, and applying clinical experience noted on the date of the review.    Dr. Joiner notified.    (x) Outpatient alf status is appropriate       RATIONALE FOR DETERMINATION:   83 year old male admitted on 3/14/2024. Patient was recently discharged from [facility] and spent about 1 day at a a memory care unit on hospice at which point he had a mechanical fall without major injury and patient's family felt he wasn't receiving the monitoring needed.   SW working on placement, no safe disposition option yet found.       Patient delayed discharge is related to disposition, there is no medical necessity for inpatient admission at the time of this review. If there is a change in patient status, please resend for review.    The information on this document is developed by the utilization review team in order for the business office to ensure compliance.  This only denotes the appropriateness of proper admission status and does not reflect the quality of care rendered.       The definitions of Inpatient Status and Observation Status used in making the determination above are those provided in the CMS Coverage Manual, Chapter 1 and Chapter 6, section 70.4.       Sincerely,    Flo Blount, DO

## 2024-03-15 NOTE — PROGRESS NOTES
Care Management Discharge Note    Discharge Date: 03/15/2024       Discharge Disposition: Hospice, Long Term Care    Discharge Services: hospice     Discharge DME: per treatment team     Discharge Transportation: agency    Private pay costs discussed: transportation costs discussed with patient's sonLuis     Does the patient's insurance plan have a 3 day qualifying hospital stay waiver?  No    PAS Confirmation Code:  [id number]  Patient/family educated on Medicare website which has current facility and service quality ratings: yes    Education Provided on the Discharge Plan: Yes  Persons Notified of Discharge Plans: Luis garcía   Patient/Family in Agreement with the Plan: yes    Handoff Referral Completed: Yes    Additional Information:    12:04 PM  DANITA met with patient and spouse Jihan.  Discussed discharge planning. Jihan expressed concerns about level of care patient needs.  SW validated concerns, patient's experience, and provided supportive listening. SW discussed that being in a skilled nursing facility on a memory care unit is the highest level of care in the community. Also discussed that regulations in SNFs are different than the hospital (ie bed / chair alarms, bed rails, restraints).  DANITA inquired if Jihan would consider working with [facility].  Jihan plans to discuss this with her sons.  Jihan agrees to SW contacting [facility] regarding patient returning to facility--SW left voice mail for admissions requesting call back.      Jihan requested call to [facility] (ph: [phone]) to inquire if  will be coming to the hospital to see patient today.  DANITA called [facility] and spoke with intake.  Patient was discharged from hospice due to readmission to the hospital and would need to sign back on to services and their staff will not be meeting with patient today.  DANITA met with Jihan to provide update. Jihan indicates they would like to sign back on with [facility] at discharge.     1:51 PM  DANITA received voice mail from patient's son, Luis, requesting call back to discuss discharge planning. DANITA returned call to Luis and left voice mail with call back number.     Pt's spouse, Jihan expressed some interest in hospice homes and called SW to inquire about availability at \[facility]\"" or [facility].  DANITA placed calls to both facilities, no openings at this time, and unsure if these facilities would be able to meet patient's needs if there was an opening.  DANITA sent referral to both facilities for formal review.      2:31 PM  DANITA spoke with Patient's son, Luis to discuss discharge planning and different options available.  After long conversation, Luis reports family is in agreement with patient returning to [facility] and would like to get patient on wait lists for hospice homes as well as learning more about in home care.  SW emailing Luis SNF list, hospice home list, and list of private duty home care agencies.     DANITA called and spoke with Isabela in admissions at [facility] to discuss patient returning.  Isabela to discuss with director of nursing and call SW back. DANITA will update Luis on response from Isabela.     DANITA called and left voice mail for [facility] requesting call back to coordinate patient signing back on to services at discharge.     4:01 PM  DANITA spoke with [facility] in admissions, facility confirms patient can return to facility this evening, requests patient arrives before 6pm.  DANITA spoke with patient's son, Luis about discharge this evening.  Luis is in agreement with this and agrees to DANITA arranging medical transport and states understanding of potential out of pocket cost.  [facility] stretcher transport arranged for  between [id number].  DANITA notified Luis of transport time.  Updated MD and charge RN.      DANITA called and left voice mail for [facility] with update regarding discharge.  DANITA requested that [facility] follows up with [facility] to  coordinate sign on.      DANITA called [facility] and requested that patient remains on wait list and son, Luis is contacted if there is an opening.       DANITA received call back from Elayne with [facility] (ph: [phone]).  DANITA faxed paperwork and orders to [facility].  Elayne confirms [facility] team will coordinate with [facility] for sign on.     Social History:  Patient hospitalized at [place] from 2/26/24-3/13/24 and discharged to [facility] with [facility]. Patient's son, Luis, is main family contact (ph: [phone]) for discharge planning.     SELINA Valverde

## 2024-03-15 NOTE — PROGRESS NOTES
"PRIMARY DIAGNOSIS: \"GENERIC\" NURSING  OUTPATIENT/OBSERVATION GOALS TO BE MET BEFORE DISCHARGE:  ADLs back to baseline: No    Activity and level of assistance: Up with maximum assistance. Consider SW and/or PT evaluation.     Pain status: Improved-controlled with oral pain medications.    Return to near baseline physical activity: No     Discharge Planner Nurse   Safe discharge environment identified: No  Barriers to discharge: Yes       Entered by: Erin Montiel RN 03/15/2024 2:35 PM     Please review provider order for any additional goals.   Nurse to notify provider when observation goals have been met and patient is ready for discharge.    Was awake this morning. C/O right arm and trunk pain. PO morphine given x 1. Pt states a relief. Incontinent of urine x 2. Wife and family in the room. Slept in the afternoon.     Erin Montiel RN    "

## 2024-03-16 NOTE — PROGRESS NOTES
[facility]    Background: Transitional Care Management program identified per system criteria and reviewed by [facility] team for possible outreach.    Assessment: Upon chart review, CCRC Team member will not proceed with patient outreach related to this episode of Transitional Care Management program due to reason below:    Non-[facility] TCU: CCRC team member noted patient discharged to TCU/ARU/LTACH. Patient is not established with a [facility] currently supported by Primary Care-Care Coordination therefore handoff to Primary Care-Care Coordination is not appropriate at this time.    Plan: Transitional Care Management episode addressed appropriately per reason noted above.      BENJIE Queen  [facility] [place], [facility]    *Connected Care Resource Team does NOT follow patient ongoing. Referrals are identified based on internal discharge reports and the outreach is to ensure patient has an understanding of their discharge instructions.

## 2024-03-20 NOTE — LETTER
3/20/2024        RE: Aaron Miller  [address]  [facility] [address]         HEALTH GERIATRIC SERVICES    Code Status:  DNR/DNI   Visit Type:   Chief Complaint   Patient presents with     LTC Admission     Hospital F/U     [facility] 3/14/2024 - 3/15/2024     Facility:  [facility] () [[id number]]         HPI: Aaron Miller is a 83 year old male who I am seeing today for admit to [facility]. Pt recently diagnosed with CNS glioma. He was at [facility] and discharged to LT on memory care. He had a mechanical fall sustaining a large laceration to right forearm. He was sent to ER for repair. He received sutures to laceration. Discussion of need for hospice during hospitalization. He was discharged back to memory care on hospice.     Today pt lying in bed. Family had been earlier visiting. Pt non responsive on exam. He is not eating or drinking. He is actively dying. He continues on hospice. He appears comfortable. He has comfort meds in place.       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   -pt returned to LTC on hospice.   -Comfort meds in place.   -pt actively dying. Non responsive on exam.      Mechanical fall  Right elbow laceration s/p repair  -Did not hit head per report, No LOC.   -Right elbow laceration status post sutures in the ER, remove in 10 days  -wound care        Active Ambulatory Problems     Diagnosis Date Noted     Actinic keratosis 10/14/2009     Allergic rhinitis 03/14/2024     Asthma 03/14/2024     Atrial fibrillation (H) 03/14/2024     Paroxysmal supraventricular tachycardia 03/14/2024     History of basal cell carcinoma (BCC) 02/26/2024     High cholesterol 10/13/2009     Hepatic cyst 07/28/2010     Erectile dysfunction 03/14/2024     Early dry stage nonexudative age-related macular degeneration of both eyes 06/26/2019     Combined forms of age-related cataract of both eyes 03/24/2021     Brain mass 02/26/2024     Major  neurocognitive disorder (H) 03/07/2024     Vitamin D deficiency 06/18/2018     Vascular bruit 08/17/2015     Scrotal bleeding 03/14/2024     PVC (premature ventricular contraction) 08/17/2015     Prostate cancer (H) 03/14/2024     Malignant neoplasm of skin 03/14/2024     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    Post Discharge Medication Reconciliation Status: discharge medications reconciled, continue medications without change  Current Outpatient Medications   Medication Sig     bisacodyl (DULCOLAX) 10 MG suppository Place 10 mg rectally daily as needed for constipation     haloperidol (HALDOL) 1 MG tablet Take 1 mg by mouth every 6 hours as needed for agitation     haloperidol (HALDOL) 2 MG/ML (HIGH CONC) solution Take 1 mg by mouth every hour as needed for agitation (severe agitation)     hyoscyamine (LEVSIN/SL) 0.125 MG sublingual tablet Place 1-2 tablets under the tongue every 4 hours as needed (copious secretions)     LORazepam (ATIVAN) 1 MG tablet Take 1 mg by mouth every 2 hours as needed for anxiety     LORazepam (ATIVAN) 2 MG/ML (HIGH CONC) oral solution Take 1 mg by mouth every 30 minutes as needed for seizures     morphine sulfate (ROXANOL) 20 mg/mL (HIGH CONC) soln Take 5 mg by mouth every hour as needed for shortness of breath or pain     prochlorperazine (COMPAZINE) 25 MG suppository Place 25 mg rectally every 12 hours as needed for nausea     haloperidol (HALDOL) 2 MG/ML (HIGH CONC) solution Take 0.5-1 mg by mouth every 4 hours as needed for agitation (Patient not taking:  "Reported on 3/20/2024)     QUEtiapine (SEROQUEL) 25 MG tablet Take 25 mg by mouth at bedtime (Patient not taking: Reported on 3/20/2024)     QUEtiapine (SEROQUEL) 25 MG tablet Take 25 mg by mouth 3 times daily as needed (severe agitation) (Patient not taking: Reported on 3/20/2024)     traZODone (DESYREL) 50 MG tablet Take 50 mg by mouth at bedtime (Patient not taking: Reported on 3/20/2024)     traZODone (DESYREL) 50 MG tablet Take 25 mg by mouth every evening At 6pm (Patient not taking: Reported on 3/20/2024)     No current facility-administered medications for this visit.       REVIEW OF SYSTEMS:   10 point review of systems reviewed and pertinent positives in the HPI.     PHYSICAL EXAMINATION:  Physical Exam     Vital signs: /77   Pulse 90   Temp 98.4  F (36.9  C)   Resp 20   Ht 1.753 m (5' 9\")   Wt 58.8 kg (129 lb 9.6 oz)   SpO2 92%   BMI 19.14 kg/m    General: Lying in bed, non responsive to tactile or verbal stimuli.   HEENT: Mouth breathing, dry oral mucosa  NECK: Supple  CVS:  S1  S2, without murmur or gallop.   LUNG: Shallow respiratory effort.   ABDOMEN: Soft, thin.   EXTREMITIES: Outstretched in bed. No mottling.   Skin: right forearm with dressing.   NEUROLOGIC: non responsive.     Labs:  All labs reviewed in the nursing home record and Epic       Medical Decision Making        This note has been dictated using voice recognition software. Any grammatical or context distortions are unintentional and inherent to the software    Electronically signed by: Elana Ron CNP       Sincerely,        Elana Ron NP      "

## 2024-03-20 NOTE — PROGRESS NOTES
[facility] GERIATRIC SERVICES    Code Status:  DNR/DNI   Visit Type:   Chief Complaint   Patient presents with    LTC Admission    Hospital F/U     [facility] 3/14/2024 - 3/15/2024     Facility:  [facility] () [[id number]]         HPI: Aaron Miller is a 83 year old male who I am seeing today for admit to [facility]. Pt recently diagnosed with CNS glioma. He was at [facility] and discharged to LTC on memory care. He had a mechanical fall sustaining a large laceration to right forearm. He was sent to ER for repair. He received sutures to laceration. Discussion of need for hospice during hospitalization. He was discharged back to memory care on hospice.     Today pt lying in bed. Family had been earlier visiting. Pt non responsive on exam. He is not eating or drinking. He is actively dying. He continues on hospice. He appears comfortable. He has comfort meds in place.       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   -pt returned to LTC on hospice.   -Comfort meds in place.   -pt actively dying. Non responsive on exam.      Mechanical fall  Right elbow laceration s/p repair  -Did not hit head per report, No LOC.   -Right elbow laceration status post sutures in the ER, remove in 10 days  -wound care        Active Ambulatory Problems     Diagnosis Date Noted    Actinic keratosis 10/14/2009    Allergic rhinitis 03/14/2024    Asthma 03/14/2024    Atrial fibrillation (H) 03/14/2024    Paroxysmal supraventricular tachycardia 03/14/2024    History of basal cell carcinoma (BCC) 02/26/2024    High cholesterol 10/13/2009    Hepatic cyst 07/28/2010    Erectile dysfunction 03/14/2024    Early dry stage nonexudative age-related macular degeneration of both eyes 06/26/2019    Combined forms of age-related cataract of both eyes 03/24/2021    Brain mass 02/26/2024    Major neurocognitive disorder (H) 03/07/2024    Vitamin D deficiency 06/18/2018    Vascular bruit 08/17/2015     Scrotal bleeding 03/14/2024    PVC (premature ventricular contraction) 08/17/2015    Prostate cancer (H) 03/14/2024    Malignant neoplasm of skin 03/14/2024    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    Post Discharge Medication Reconciliation Status: discharge medications reconciled, continue medications without change  Current Outpatient Medications   Medication Sig    bisacodyl (DULCOLAX) 10 MG suppository Place 10 mg rectally daily as needed for constipation    haloperidol (HALDOL) 1 MG tablet Take 1 mg by mouth every 6 hours as needed for agitation    haloperidol (HALDOL) 2 MG/ML (HIGH CONC) solution Take 1 mg by mouth every hour as needed for agitation (severe agitation)    hyoscyamine (LEVSIN/SL) 0.125 MG sublingual tablet Place 1-2 tablets under the tongue every 4 hours as needed (copious secretions)    LORazepam (ATIVAN) 1 MG tablet Take 1 mg by mouth every 2 hours as needed for anxiety    LORazepam (ATIVAN) 2 MG/ML (HIGH CONC) oral solution Take 1 mg by mouth every 30 minutes as needed for seizures    morphine sulfate (ROXANOL) 20 mg/mL (HIGH CONC) soln Take 5 mg by mouth every hour as needed for shortness of breath or pain    prochlorperazine (COMPAZINE) 25 MG suppository Place 25 mg rectally every 12 hours as needed for nausea    haloperidol (HALDOL) 2 MG/ML (HIGH CONC) solution Take 0.5-1 mg by mouth every 4 hours as needed for agitation (Patient not taking: Reported on 3/20/2024)    QUEtiapine (SEROQUEL) 25 MG tablet Take 25 mg by mouth at bedtime (Patient not taking: Reported on  "3/20/2024)    QUEtiapine (SEROQUEL) 25 MG tablet Take 25 mg by mouth 3 times daily as needed (severe agitation) (Patient not taking: Reported on 3/20/2024)    traZODone (DESYREL) 50 MG tablet Take 50 mg by mouth at bedtime (Patient not taking: Reported on 3/20/2024)    traZODone (DESYREL) 50 MG tablet Take 25 mg by mouth every evening At 6pm (Patient not taking: Reported on 3/20/2024)     No current facility-administered medications for this visit.       REVIEW OF SYSTEMS:   10 point review of systems reviewed and pertinent positives in the HPI.     PHYSICAL EXAMINATION:  Physical Exam     Vital signs: /77   Pulse 90   Temp 98.4  F (36.9  C)   Resp 20   Ht 1.753 m (5' 9\")   Wt 58.8 kg (129 lb 9.6 oz)   SpO2 92%   BMI 19.14 kg/m    General: Lying in bed, non responsive to tactile or verbal stimuli.   HEENT: Mouth breathing, dry oral mucosa  NECK: Supple  CVS:  S1  S2, without murmur or gallop.   LUNG: Shallow respiratory effort.   ABDOMEN: Soft, thin.   EXTREMITIES: Outstretched in bed. No mottling.   Skin: right forearm with dressing.   NEUROLOGIC: non responsive.     Labs:  All labs reviewed in the nursing home record and Epic       Medical Decision Making        This note has been dictated using voice recognition software. Any grammatical or context distortions are unintentional and inherent to the software    Electronically signed by: Elana Ron CNP   "

## 2024-03-25 ENCOUNTER — TELEPHONE (OUTPATIENT)
Dept: GERIATRICS | Facility: CLINIC | Age: 84
End: 2024-03-25
Payer: COMMERCIAL